# Patient Record
Sex: FEMALE | Race: WHITE | NOT HISPANIC OR LATINO | URBAN - METROPOLITAN AREA
[De-identification: names, ages, dates, MRNs, and addresses within clinical notes are randomized per-mention and may not be internally consistent; named-entity substitution may affect disease eponyms.]

---

## 2019-08-06 ENCOUNTER — INPATIENT (INPATIENT)
Facility: HOSPITAL | Age: 64
LOS: 0 days | Discharge: HOME | DRG: 392 | End: 2019-08-07
Attending: STUDENT IN AN ORGANIZED HEALTH CARE EDUCATION/TRAINING PROGRAM | Admitting: STUDENT IN AN ORGANIZED HEALTH CARE EDUCATION/TRAINING PROGRAM
Payer: COMMERCIAL

## 2019-08-06 VITALS
DIASTOLIC BLOOD PRESSURE: 79 MMHG | RESPIRATION RATE: 16 BRPM | OXYGEN SATURATION: 97 % | SYSTOLIC BLOOD PRESSURE: 137 MMHG | HEIGHT: 61 IN | WEIGHT: 216.05 LBS | HEART RATE: 76 BPM | TEMPERATURE: 99 F

## 2019-08-06 DIAGNOSIS — F31.9 BIPOLAR DISORDER, UNSPECIFIED: ICD-10-CM

## 2019-08-06 DIAGNOSIS — I10 ESSENTIAL (PRIMARY) HYPERTENSION: ICD-10-CM

## 2019-08-06 DIAGNOSIS — Z91.89 OTHER SPECIFIED PERSONAL RISK FACTORS, NOT ELSEWHERE CLASSIFIED: ICD-10-CM

## 2019-08-06 DIAGNOSIS — R63.8 OTHER SYMPTOMS AND SIGNS CONCERNING FOOD AND FLUID INTAKE: ICD-10-CM

## 2019-08-06 DIAGNOSIS — E78.5 HYPERLIPIDEMIA, UNSPECIFIED: ICD-10-CM

## 2019-08-06 DIAGNOSIS — K52.9 NONINFECTIVE GASTROENTERITIS AND COLITIS, UNSPECIFIED: ICD-10-CM

## 2019-08-06 LAB
ALBUMIN SERPL ELPH-MCNC: 4 G/DL — SIGNIFICANT CHANGE UP (ref 3.3–5)
ALP SERPL-CCNC: 111 U/L — SIGNIFICANT CHANGE UP (ref 40–120)
ALT FLD-CCNC: 13 U/L — SIGNIFICANT CHANGE UP (ref 10–45)
ANION GAP SERPL CALC-SCNC: 11 MMOL/L — SIGNIFICANT CHANGE UP (ref 5–17)
APTT BLD: 32.2 SEC — SIGNIFICANT CHANGE UP (ref 27.5–36.3)
AST SERPL-CCNC: 18 U/L — SIGNIFICANT CHANGE UP (ref 10–40)
BASOPHILS # BLD AUTO: 0.01 K/UL — SIGNIFICANT CHANGE UP (ref 0–0.2)
BASOPHILS NFR BLD AUTO: 0.2 % — SIGNIFICANT CHANGE UP (ref 0–2)
BILIRUB SERPL-MCNC: 0.6 MG/DL — SIGNIFICANT CHANGE UP (ref 0.2–1.2)
BLD GP AB SCN SERPL QL: NEGATIVE — SIGNIFICANT CHANGE UP
BUN SERPL-MCNC: 12 MG/DL — SIGNIFICANT CHANGE UP (ref 7–23)
CALCIUM SERPL-MCNC: 9.3 MG/DL — SIGNIFICANT CHANGE UP (ref 8.4–10.5)
CHLORIDE SERPL-SCNC: 109 MMOL/L — HIGH (ref 96–108)
CO2 SERPL-SCNC: 25 MMOL/L — SIGNIFICANT CHANGE UP (ref 22–31)
CREAT SERPL-MCNC: 0.84 MG/DL — SIGNIFICANT CHANGE UP (ref 0.5–1.3)
EOSINOPHIL # BLD AUTO: 0.02 K/UL — SIGNIFICANT CHANGE UP (ref 0–0.5)
EOSINOPHIL NFR BLD AUTO: 0.4 % — SIGNIFICANT CHANGE UP (ref 0–6)
GLUCOSE SERPL-MCNC: 115 MG/DL — HIGH (ref 70–99)
HCT VFR BLD CALC: 41.1 % — SIGNIFICANT CHANGE UP (ref 34.5–45)
HGB BLD-MCNC: 13.6 G/DL — SIGNIFICANT CHANGE UP (ref 11.5–15.5)
IMM GRANULOCYTES NFR BLD AUTO: 0.2 % — SIGNIFICANT CHANGE UP (ref 0–1.5)
INR BLD: 1.17 — HIGH (ref 0.88–1.16)
LYMPHOCYTES # BLD AUTO: 1.01 K/UL — SIGNIFICANT CHANGE UP (ref 1–3.3)
LYMPHOCYTES # BLD AUTO: 18.4 % — SIGNIFICANT CHANGE UP (ref 13–44)
MCHC RBC-ENTMCNC: 29.5 PG — SIGNIFICANT CHANGE UP (ref 27–34)
MCHC RBC-ENTMCNC: 33.1 GM/DL — SIGNIFICANT CHANGE UP (ref 32–36)
MCV RBC AUTO: 89.2 FL — SIGNIFICANT CHANGE UP (ref 80–100)
MONOCYTES # BLD AUTO: 0.36 K/UL — SIGNIFICANT CHANGE UP (ref 0–0.9)
MONOCYTES NFR BLD AUTO: 6.6 % — SIGNIFICANT CHANGE UP (ref 2–14)
NEUTROPHILS # BLD AUTO: 4.08 K/UL — SIGNIFICANT CHANGE UP (ref 1.8–7.4)
NEUTROPHILS NFR BLD AUTO: 74.2 % — SIGNIFICANT CHANGE UP (ref 43–77)
NRBC # BLD: 0 /100 WBCS — SIGNIFICANT CHANGE UP (ref 0–0)
OB PNL STL: NEGATIVE — SIGNIFICANT CHANGE UP
PLATELET # BLD AUTO: 212 K/UL — SIGNIFICANT CHANGE UP (ref 150–400)
POTASSIUM SERPL-MCNC: 3.3 MMOL/L — LOW (ref 3.5–5.3)
POTASSIUM SERPL-SCNC: 3.3 MMOL/L — LOW (ref 3.5–5.3)
PROT SERPL-MCNC: 6.9 G/DL — SIGNIFICANT CHANGE UP (ref 6–8.3)
PROTHROM AB SERPL-ACNC: 13.3 SEC — HIGH (ref 10–12.9)
RBC # BLD: 4.61 M/UL — SIGNIFICANT CHANGE UP (ref 3.8–5.2)
RBC # FLD: 13.2 % — SIGNIFICANT CHANGE UP (ref 10.3–14.5)
RH IG SCN BLD-IMP: POSITIVE — SIGNIFICANT CHANGE UP
SODIUM SERPL-SCNC: 145 MMOL/L — SIGNIFICANT CHANGE UP (ref 135–145)
WBC # BLD: 5.49 K/UL — SIGNIFICANT CHANGE UP (ref 3.8–10.5)
WBC # FLD AUTO: 5.49 K/UL — SIGNIFICANT CHANGE UP (ref 3.8–10.5)

## 2019-08-06 PROCEDURE — 99285 EMERGENCY DEPT VISIT HI MDM: CPT

## 2019-08-06 PROCEDURE — 74177 CT ABD & PELVIS W/CONTRAST: CPT | Mod: 26

## 2019-08-06 RX ORDER — POTASSIUM CHLORIDE 20 MEQ
40 PACKET (EA) ORAL ONCE
Refills: 0 | Status: COMPLETED | OUTPATIENT
Start: 2019-08-06 | End: 2019-08-06

## 2019-08-06 RX ORDER — METRONIDAZOLE 500 MG
500 TABLET ORAL ONCE
Refills: 0 | Status: COMPLETED | OUTPATIENT
Start: 2019-08-06 | End: 2019-08-06

## 2019-08-06 RX ORDER — ONDANSETRON 8 MG/1
4 TABLET, FILM COATED ORAL ONCE
Refills: 0 | Status: COMPLETED | OUTPATIENT
Start: 2019-08-06 | End: 2019-08-06

## 2019-08-06 RX ORDER — SODIUM CHLORIDE 9 MG/ML
1000 INJECTION INTRAMUSCULAR; INTRAVENOUS; SUBCUTANEOUS ONCE
Refills: 0 | Status: COMPLETED | OUTPATIENT
Start: 2019-08-06 | End: 2019-08-06

## 2019-08-06 RX ORDER — CIPROFLOXACIN LACTATE 400MG/40ML
400 VIAL (ML) INTRAVENOUS ONCE
Refills: 0 | Status: COMPLETED | OUTPATIENT
Start: 2019-08-06 | End: 2019-08-06

## 2019-08-06 RX ORDER — PANTOPRAZOLE SODIUM 20 MG/1
80 TABLET, DELAYED RELEASE ORAL ONCE
Refills: 0 | Status: COMPLETED | OUTPATIENT
Start: 2019-08-06 | End: 2019-08-06

## 2019-08-06 RX ORDER — ACETAMINOPHEN 500 MG
650 TABLET ORAL ONCE
Refills: 0 | Status: COMPLETED | OUTPATIENT
Start: 2019-08-06 | End: 2019-08-06

## 2019-08-06 RX ORDER — IOHEXOL 300 MG/ML
30 INJECTION, SOLUTION INTRAVENOUS ONCE
Refills: 0 | Status: COMPLETED | OUTPATIENT
Start: 2019-08-06 | End: 2019-08-06

## 2019-08-06 RX ORDER — OLANZAPINE 15 MG/1
15 TABLET, FILM COATED ORAL EVERY 24 HOURS
Refills: 0 | Status: DISCONTINUED | OUTPATIENT
Start: 2019-08-07 | End: 2019-08-07

## 2019-08-06 RX ADMIN — Medication 200 MILLIGRAM(S): at 21:51

## 2019-08-06 RX ADMIN — PANTOPRAZOLE SODIUM 80 MILLIGRAM(S): 20 TABLET, DELAYED RELEASE ORAL at 17:19

## 2019-08-06 RX ADMIN — ONDANSETRON 4 MILLIGRAM(S): 8 TABLET, FILM COATED ORAL at 17:20

## 2019-08-06 RX ADMIN — ONDANSETRON 4 MILLIGRAM(S): 8 TABLET, FILM COATED ORAL at 21:51

## 2019-08-06 RX ADMIN — Medication 650 MILLIGRAM(S): at 17:20

## 2019-08-06 RX ADMIN — IOHEXOL 30 MILLILITER(S): 300 INJECTION, SOLUTION INTRAVENOUS at 17:20

## 2019-08-06 RX ADMIN — Medication 650 MILLIGRAM(S): at 18:20

## 2019-08-06 RX ADMIN — Medication 100 MILLIGRAM(S): at 22:51

## 2019-08-06 RX ADMIN — SODIUM CHLORIDE 1000 MILLILITER(S): 9 INJECTION INTRAMUSCULAR; INTRAVENOUS; SUBCUTANEOUS at 17:20

## 2019-08-06 RX ADMIN — SODIUM CHLORIDE 1000 MILLILITER(S): 9 INJECTION INTRAMUSCULAR; INTRAVENOUS; SUBCUTANEOUS at 21:51

## 2019-08-06 NOTE — H&P ADULT - PROBLEM SELECTOR PLAN 1
ct abd showing colitis, no wbc or fevers, ddx: infectious vs ibd vs ibs. s/p cipro flagyl in ed  -monitor wbc/ fevers  -gi pcr  -ivf as needed  -stool cx  -gi fu outpt for r/o IBD or IBS if infectious w/u neg  -ceftriaxone and flagyl (pen allergic but last rxn as a child and only hives)    #reported black stool  reports black tarry stool x 2 weeks, no red blood, vss, hb 13, low concern for ugib given nl hb and bun (pt would have hb drop if 2 weeks of gib), fobt neg  -monitor hb  -monitor bms to assess if look like melena ct abd showing colitis, no wbc or fevers, ddx: infectious vs ibd vs ibs. s/p cipro flagyl in ed  -monitor wbc/ fevers  -gi pcr  -ivf as needed  -stool cx  -gi fu outpt for r/o IBD or IBS if infectious w/u neg  -ceftriaxone and flagyl (pen allergic but last rxn as a child and only hives)  -zofran prn for nausea  -ekg for qtc monitoring    #reported black stool  reports black tarry stool x 2 weeks, no red blood, vss, hb 13, low concern for ugib given nl hb and bun (pt would have hb drop if 2 weeks of gib), fobt neg  -monitor hb  -monitor bms to assess if look like melena

## 2019-08-06 NOTE — H&P ADULT - HISTORY OF PRESENT ILLNESS
63F PMH bipolar, HTN, HLD p/w n/v/d x 2 weeks. Pt reports 2 weeks of n/v/d as well as weakness x 2 weeks, and 30 lb weight loss in past month due to poor appetite Pt reports has not ate in 2 weeks, only been able to tolerate liquids due to poor appetite and nausea. Her diarrhea is loose BMs (non watery), 3-4 per day (baseline 1/day), nonbloody. Does endorse that stool is "black and tarry." Vomiting is nonbloody. Denies abd pain, fevers, chills. No recent travel (except to [place] 3 weeks ago), no unusual food, no sick contacts. No GI hx, had nl colonoscopy and EGD (unknown why, pt reports for screening) 10 years ago. Denies dizziness, or SOB.     In ED VSS, no anemia, CT abd showing colitis. S/p cipro and flagyl in ED and 2 L NS, s/p zofran for nausea.

## 2019-08-06 NOTE — H&P ADULT - NSHPSOCIALHISTORY_GEN_ALL_CORE
Lives with , independent in ADLS/ IADLS. No drug use. Quit smoking 20 years ago (smoked a few cigs/ week for 5 years). Drinks 1 drink per day until 2 weeks ago (when sx started).

## 2019-08-06 NOTE — H&P ADULT - NSHPPHYSICALEXAM_GEN_ALL_CORE
.  VITAL SIGNS:  T(C): 36.6 (08-06-19 @ 22:57), Max: 37.1 (08-06-19 @ 16:06)  T(F): 97.8 (08-06-19 @ 22:57), Max: 98.8 (08-06-19 @ 16:06)  HR: 96 (08-06-19 @ 22:57) (76 - 96)  BP: 148/84 (08-06-19 @ 22:57) (125/75 - 148/84)  BP(mean): --  RR: 18 (08-06-19 @ 22:57) (16 - 18)  SpO2: 95% (08-06-19 @ 22:57) (95% - 97%)  Wt(kg): --    PHYSICAL EXAM:    Constitutional: WDWN resting comfortably in bed; NAD  Head: NC/AT  Eyes: PERRL, EOMI, clear conjunctiva  ENT: no nasal discharge; uvula midline, no oropharyngeal erythema or exudates; MMM  Neck: supple; no JVD or thyromegaly  Respiratory: CTA B/L; no W/R/R, no retractions  Cardiac: +S1/S2; RRR; no M/R/G;  Gastrointestinal: soft, NT/ND; no rebound or guarding; +BSx4  Extremities: WWP, no clubbing or cyanosis; no peripheral edema  Musculoskeletal: NROM x4; no joint swelling, tenderness or erythema  Vascular: 2+ radial, femoral, DP/PT pulses B/L  Dermatologic: skin warm, dry and intact; no rashes, wounds, or scars  Neurologic: AAOx3; CNII-XII grossly intact; no focal deficits  Psychiatric: affect and characteristics of appearance, verbalizations, behaviors are appropriate .  VITAL SIGNS:  T(C): 36.6 (08-06-19 @ 22:57), Max: 37.1 (08-06-19 @ 16:06)  T(F): 97.8 (08-06-19 @ 22:57), Max: 98.8 (08-06-19 @ 16:06)  HR: 96 (08-06-19 @ 22:57) (76 - 96)  BP: 148/84 (08-06-19 @ 22:57) (125/75 - 148/84)  BP(mean): --  RR: 18 (08-06-19 @ 22:57) (16 - 18)  SpO2: 95% (08-06-19 @ 22:57) (95% - 97%)  Wt(kg): --    PHYSICAL EXAM:  Constitutional: WDWN resting comfortably in bed; NAD  Head: NC/AT  Eyes: PERRL, EOMI, clear conjunctiva  ENT: no nasal discharge; uvula midline, no oropharyngeal erythema or exudates; MMM  Neck: supple; no JVD or thyromegaly  Respiratory: CTA B/L; no W/R/R, no retractions  Cardiac: +S1/S2; RRR; no M/R/G;  Gastrointestinal: soft, tender in LLQ, ND; no rebound or guarding; +BSx4  Extremities: WWP, no clubbing or cyanosis; no peripheral edema  Musculoskeletal: NROM x4; no joint swelling, tenderness or erythema  Vascular: 2+ radial, femoral, DP/PT pulses B/L  Dermatologic: skin warm, dry and intact; no rashes, wounds, or scars  Neurologic: AAOx3; CNII-XII grossly intact; no focal deficits  Psychiatric: affect and characteristics of appearance, verbalizations, behaviors are appropriate

## 2019-08-06 NOTE — ED ADULT TRIAGE NOTE - CHIEF COMPLAINT QUOTE
Patient states was sent by Dr. White for CT scan to r/o Diverticulitis, c/o of nausea, vomitting, black stools, decreased appetite and black stools and LLQ abdominal pain, symptoms X 2 weeks.

## 2019-08-06 NOTE — ED ADULT NURSE NOTE - OBJECTIVE STATEMENT
Pt is a 63y female presented to ED w/ c/o lower quadrant abdominal pain on palpation, dark tarry stools, N/V x 2 weeks and unintentional weight loss. Denies fever/chills/ chest pain/ SOB.

## 2019-08-06 NOTE — ED ADULT NURSE REASSESSMENT NOTE - NS ED NURSE REASSESS COMMENT FT1
Admitting team at bedside, plan of care explained. Pt in no acute distress. Will continue to monitor.

## 2019-08-06 NOTE — H&P ADULT - PROBLEM SELECTOR PLAN 5
F: PO hydration, ivf as needed  E: replete PRN  N: dash diet, if tolerated  DVT ppx: lovenox  other ppx: none required  FULL CODE  dispo: RMF  Ambulate as tolerated

## 2019-08-06 NOTE — ED PROVIDER NOTE - OBJECTIVE STATEMENT
63F PMH bipolar, HTN p/w NV. Pt states that she has 2w of multiple NBNB NV. Also w/ 3-4 episodes of black tarry stool x2w. Also ~1mo of ~30lb unintentional weight loss and occasional night sweats. Denies any abd pain but feels "sensitive" to L abd. Went to pmd today who referred to ED. Denies HA, SOB/CP, lightheaded, rashes, recent travel, sick contacts, urinary complaints, LE pain/swelling, bloody stool. No prior EGD/colonoscopy. Has cough for many yrs, no acute changes.   PMD Dr. Peggy White  Cannot recall all meds, not on any AC

## 2019-08-06 NOTE — ED PROVIDER NOTE - PROGRESS NOTE DETAILS
Klepfish: labs grossly wnl. CT c/w ileocolitis. Pt has no pain but does have persistent nausea and unable to tolerate po in ED. Pt very uncomfortable going home. Given persistent symptoms and unable to tolerate po, will admit for further care. PMD not at [facility].

## 2019-08-06 NOTE — H&P ADULT - ATTENDING COMMENTS
Pt. seen and examined by me earlier today; I have read Dr. Pompa's H&P, I agree w/ her findings and plan of care as documented, w/ update; Pt. w/ acute colitis, likely infectious; Pt. clinically much-improved s/p abx, tolerating PO; hypokalemia repleted; Pt. medically-clear for d/c home on 5-day course of PO abx, bland diet as tolerated; Pt. advised to f/u w/ GI as outpatient

## 2019-08-06 NOTE — ED PROVIDER NOTE - PHYSICAL EXAMINATION
abd: BS+, soft, minimal LUQ and LLQ ttp, no rebound/guarding.  no LE edema, normal equal distal pulses. no CVAT. abd: BS+, soft, minimal LUQ and LLQ ttp, no rebound/guarding.  no LE edema, normal equal distal pulses. no CVAT.  TRE woods chaperone: minimal specks of black on digital exam, no brbpr.

## 2019-08-06 NOTE — H&P ADULT - NSHPLABSRESULTS_GEN_ALL_CORE
.  LABS:                         13.6   5.49  )-----------( 212      ( 06 Aug 2019 17:17 )             41.1     08-06    145  |  109<H>  |  12  ----------------------------<  115<H>  3.3<L>   |  25  |  0.84    Ca    9.3      06 Aug 2019 17:17    TPro  6.9  /  Alb  4.0  /  TBili  0.6  /  DBili  x   /  AST  18  /  ALT  13  /  AlkPhos  111  08-06    PT/INR - ( 06 Aug 2019 17:17 )   PT: 13.3 sec;   INR: 1.17          PTT - ( 06 Aug 2019 17:17 )  PTT:32.2 sec              RADIOLOGY, EKG & ADDITIONAL TESTS: Reviewed.

## 2019-08-07 ENCOUNTER — TRANSCRIPTION ENCOUNTER (OUTPATIENT)
Age: 64
End: 2019-08-07

## 2019-08-07 VITALS
SYSTOLIC BLOOD PRESSURE: 125 MMHG | DIASTOLIC BLOOD PRESSURE: 84 MMHG | OXYGEN SATURATION: 97 % | TEMPERATURE: 98 F | HEART RATE: 86 BPM | RESPIRATION RATE: 17 BRPM

## 2019-08-07 LAB
ALBUMIN SERPL ELPH-MCNC: 3.3 G/DL — SIGNIFICANT CHANGE UP (ref 3.3–5)
ALP SERPL-CCNC: 91 U/L — SIGNIFICANT CHANGE UP (ref 40–120)
ALT FLD-CCNC: 11 U/L — SIGNIFICANT CHANGE UP (ref 10–45)
ANION GAP SERPL CALC-SCNC: 9 MMOL/L — SIGNIFICANT CHANGE UP (ref 5–17)
AST SERPL-CCNC: 13 U/L — SIGNIFICANT CHANGE UP (ref 10–40)
BILIRUB SERPL-MCNC: 0.4 MG/DL — SIGNIFICANT CHANGE UP (ref 0.2–1.2)
BUN SERPL-MCNC: 9 MG/DL — SIGNIFICANT CHANGE UP (ref 7–23)
CALCIUM SERPL-MCNC: 8.4 MG/DL — SIGNIFICANT CHANGE UP (ref 8.4–10.5)
CHLORIDE SERPL-SCNC: 112 MMOL/L — HIGH (ref 96–108)
CO2 SERPL-SCNC: 23 MMOL/L — SIGNIFICANT CHANGE UP (ref 22–31)
CREAT SERPL-MCNC: 0.72 MG/DL — SIGNIFICANT CHANGE UP (ref 0.5–1.3)
GLUCOSE SERPL-MCNC: 99 MG/DL — SIGNIFICANT CHANGE UP (ref 70–99)
HCT VFR BLD CALC: 37 % — SIGNIFICANT CHANGE UP (ref 34.5–45)
HCV AB S/CO SERPL IA: 0.06 S/CO — SIGNIFICANT CHANGE UP
HCV AB SERPL-IMP: SIGNIFICANT CHANGE UP
HGB BLD-MCNC: 11.8 G/DL — SIGNIFICANT CHANGE UP (ref 11.5–15.5)
HIV 1+2 AB+HIV1 P24 AG SERPL QL IA: SIGNIFICANT CHANGE UP
MAGNESIUM SERPL-MCNC: 2.1 MG/DL — SIGNIFICANT CHANGE UP (ref 1.6–2.6)
MCHC RBC-ENTMCNC: 28.9 PG — SIGNIFICANT CHANGE UP (ref 27–34)
MCHC RBC-ENTMCNC: 31.9 GM/DL — LOW (ref 32–36)
MCV RBC AUTO: 90.7 FL — SIGNIFICANT CHANGE UP (ref 80–100)
NRBC # BLD: 0 /100 WBCS — SIGNIFICANT CHANGE UP (ref 0–0)
PHOSPHATE SERPL-MCNC: 3.5 MG/DL — SIGNIFICANT CHANGE UP (ref 2.5–4.5)
PLATELET # BLD AUTO: 193 K/UL — SIGNIFICANT CHANGE UP (ref 150–400)
POTASSIUM SERPL-MCNC: 3.2 MMOL/L — LOW (ref 3.5–5.3)
POTASSIUM SERPL-SCNC: 3.2 MMOL/L — LOW (ref 3.5–5.3)
PROT SERPL-MCNC: 5.9 G/DL — LOW (ref 6–8.3)
RBC # BLD: 4.08 M/UL — SIGNIFICANT CHANGE UP (ref 3.8–5.2)
RBC # FLD: 13.2 % — SIGNIFICANT CHANGE UP (ref 10.3–14.5)
SODIUM SERPL-SCNC: 144 MMOL/L — SIGNIFICANT CHANGE UP (ref 135–145)
WBC # BLD: 5.48 K/UL — SIGNIFICANT CHANGE UP (ref 3.8–10.5)
WBC # FLD AUTO: 5.48 K/UL — SIGNIFICANT CHANGE UP (ref 3.8–10.5)

## 2019-08-07 PROCEDURE — 85610 PROTHROMBIN TIME: CPT

## 2019-08-07 PROCEDURE — 96374 THER/PROPH/DIAG INJ IV PUSH: CPT | Mod: XU

## 2019-08-07 PROCEDURE — 87389 HIV-1 AG W/HIV-1&-2 AB AG IA: CPT

## 2019-08-07 PROCEDURE — 36415 COLL VENOUS BLD VENIPUNCTURE: CPT

## 2019-08-07 PROCEDURE — 99285 EMERGENCY DEPT VISIT HI MDM: CPT | Mod: 25

## 2019-08-07 PROCEDURE — 86803 HEPATITIS C AB TEST: CPT

## 2019-08-07 PROCEDURE — 85730 THROMBOPLASTIN TIME PARTIAL: CPT

## 2019-08-07 PROCEDURE — 80053 COMPREHEN METABOLIC PANEL: CPT

## 2019-08-07 PROCEDURE — 83735 ASSAY OF MAGNESIUM: CPT

## 2019-08-07 PROCEDURE — 96375 TX/PRO/DX INJ NEW DRUG ADDON: CPT

## 2019-08-07 PROCEDURE — 84100 ASSAY OF PHOSPHORUS: CPT

## 2019-08-07 PROCEDURE — 86850 RBC ANTIBODY SCREEN: CPT

## 2019-08-07 PROCEDURE — 85025 COMPLETE CBC W/AUTO DIFF WBC: CPT

## 2019-08-07 PROCEDURE — 99223 1ST HOSP IP/OBS HIGH 75: CPT | Mod: GC,AI

## 2019-08-07 PROCEDURE — 86900 BLOOD TYPING SEROLOGIC ABO: CPT

## 2019-08-07 PROCEDURE — 74177 CT ABD & PELVIS W/CONTRAST: CPT

## 2019-08-07 PROCEDURE — 86901 BLOOD TYPING SEROLOGIC RH(D): CPT

## 2019-08-07 PROCEDURE — 85027 COMPLETE CBC AUTOMATED: CPT

## 2019-08-07 RX ORDER — CEFPODOXIME PROXETIL 100 MG
2 TABLET ORAL
Qty: 12 | Refills: 0
Start: 2019-08-07 | End: 2019-08-12

## 2019-08-07 RX ORDER — CEFTRIAXONE 500 MG/1
1000 INJECTION, POWDER, FOR SOLUTION INTRAMUSCULAR; INTRAVENOUS EVERY 24 HOURS
Refills: 0 | Status: DISCONTINUED | OUTPATIENT
Start: 2019-08-07 | End: 2019-08-07

## 2019-08-07 RX ORDER — METRONIDAZOLE 500 MG
500 TABLET ORAL EVERY 8 HOURS
Refills: 0 | Status: DISCONTINUED | OUTPATIENT
Start: 2019-08-07 | End: 2019-08-07

## 2019-08-07 RX ORDER — METRONIDAZOLE 500 MG
1 TABLET ORAL
Qty: 21 | Refills: 0
Start: 2019-08-07 | End: 2019-08-13

## 2019-08-07 RX ORDER — ENOXAPARIN SODIUM 100 MG/ML
60 INJECTION SUBCUTANEOUS EVERY 24 HOURS
Refills: 0 | Status: DISCONTINUED | OUTPATIENT
Start: 2019-08-07 | End: 2019-08-07

## 2019-08-07 RX ORDER — ONDANSETRON 8 MG/1
4 TABLET, FILM COATED ORAL EVERY 6 HOURS
Refills: 0 | Status: DISCONTINUED | OUTPATIENT
Start: 2019-08-07 | End: 2019-08-07

## 2019-08-07 RX ORDER — METRONIDAZOLE 500 MG
1 TABLET ORAL
Qty: 18 | Refills: 0
Start: 2019-08-07 | End: 2019-08-12

## 2019-08-07 RX ORDER — ATORVASTATIN CALCIUM 80 MG/1
80 TABLET, FILM COATED ORAL AT BEDTIME
Refills: 0 | Status: DISCONTINUED | OUTPATIENT
Start: 2019-08-07 | End: 2019-08-07

## 2019-08-07 RX ORDER — POTASSIUM CHLORIDE 20 MEQ
40 PACKET (EA) ORAL EVERY 4 HOURS
Refills: 0 | Status: COMPLETED | OUTPATIENT
Start: 2019-08-07 | End: 2019-08-07

## 2019-08-07 RX ADMIN — Medication 100 MILLIGRAM(S): at 06:21

## 2019-08-07 RX ADMIN — Medication 40 MILLIEQUIVALENT(S): at 00:00

## 2019-08-07 RX ADMIN — ENOXAPARIN SODIUM 60 MILLIGRAM(S): 100 INJECTION SUBCUTANEOUS at 06:21

## 2019-08-07 RX ADMIN — CEFTRIAXONE 100 MILLIGRAM(S): 500 INJECTION, POWDER, FOR SOLUTION INTRAMUSCULAR; INTRAVENOUS at 10:14

## 2019-08-07 RX ADMIN — Medication 40 MILLIEQUIVALENT(S): at 10:13

## 2019-08-07 RX ADMIN — Medication 100 MILLIGRAM(S): at 13:48

## 2019-08-07 RX ADMIN — Medication 40 MILLIEQUIVALENT(S): at 13:48

## 2019-08-07 RX ADMIN — OLANZAPINE 15 MILLIGRAM(S): 15 TABLET, FILM COATED ORAL at 00:16

## 2019-08-07 NOTE — DISCHARGE NOTE NURSING/CASE MANAGEMENT/SOCIAL WORK - NSDCFUADDAPPT_GEN_ALL_CORE_FT
Christopher Woods MD  Internal Medicine  [address], [place] [address]  Phone: [phone]  Appointment: 8/13 2:30 PM

## 2019-08-07 NOTE — DISCHARGE NOTE PROVIDER - CARE PROVIDER_API CALL
Peggy White  [address], [place] [address]  Phone: [phone]  Fax: (   )    -  Follow Up Time:

## 2019-08-07 NOTE — DISCHARGE NOTE NURSING/CASE MANAGEMENT/SOCIAL WORK - NSDCDPATPORTLINK_GEN_ALL_CORE
You can access the [facility] Patient Portal, offered by [facility], by registering with the following website: [email or web address]

## 2019-08-07 NOTE — DISCHARGE NOTE PROVIDER - HOSPITAL COURSE
63F PMH bipolar, HTN, HLD presenting w/ nausea, vomiting, and loose non-bloody dark diarrhea x 2 weeks w/ a 30 lb weight loss in the past month due to poor appetite. In ED VSS, no white count, no anemia, CT abd showed mild ilieocolitis and pt received started on cipro and flagyl and given Zofran for nausea. The source of colitis is unclear as may be infectious vs inflammatory. Pt does report family history w/ mother having multiple bouts of diarrhea throughout her life. Pt d/c in stable condition, eating w/ appetite, and having BM and passing gas and to follow up with her pcp Dr. White. 63F PMH bipolar, HTN, HLD presenting w/ nausea, vomiting, and loose non-bloody dark diarrhea x 2 weeks w/ a 30 lb weight loss in the past month due to poor appetite. In ED VSS, no white count, no anemia, CT abd showed mild ilieocolitis and pt received started on cipro and flagyl and given Zofran for nausea. The source of colitis is unclear as may be infectious vs inflammatory. Pt does report family history w/ mother having multiple bouts of diarrhea throughout her life. Pt d/c in stable condition, eating w/ appetite, and having BM and passing gas and to follow up with her pcp Dr. White. D/c w/ antibiotics and complete a 7 day course.

## 2019-08-07 NOTE — DISCHARGE NOTE PROVIDER - PROVIDER TOKENS
FREE:[LAST:[Chritsopher],FIRST:[Peggy],PHONE:[phone]],FAX:[(   )    -],ADDRESS:[[facility] [place] [address] [place] [address]]]

## 2019-08-07 NOTE — DISCHARGE NOTE PROVIDER - NSDCFUADDAPPT_GEN_ALL_CORE_FT
Christopher Woods MD  Internal Medicine  [address], [place] [address]  Phone: [phone]  Appointment: 8/13 2:30 PM

## 2019-08-07 NOTE — DISCHARGE NOTE PROVIDER - NSDCCPCAREPLAN_GEN_ALL_CORE_FT
PRINCIPAL DISCHARGE DIAGNOSIS  Diagnosis: Ileocolitis  Assessment and Plan of Treatment: You came in with nausea, vomiting, and diarrhea which you had for the last 2 weeks. You were found to have "iliocolitis", which means there was inflammation in your colon. This may be due to a number of different causes such as infection or inflammatory bowel disease. Make sure to continue with your prescribed antibiotics of flagyl and ceftriazone as prescribed and to follow up with Dr. White about this. PRINCIPAL DISCHARGE DIAGNOSIS  Diagnosis: Ileocolitis  Assessment and Plan of Treatment: You came in with nausea, vomiting, and diarrhea which you had for the last 2 weeks. You were found to have "iliocolitis", which means there was inflammation in your colon. This may be due to a number of different causes such as infection or inflammatory bowel disease. Make sure to continue with your prescribed antibiotics of flagyl and cefpodomime for 6 more days as prescribed and to follow up with Dr. White about this.

## 2019-08-19 DIAGNOSIS — A09 INFECTIOUS GASTROENTERITIS AND COLITIS, UNSPECIFIED: ICD-10-CM

## 2019-08-19 DIAGNOSIS — Z87.891 PERSONAL HISTORY OF NICOTINE DEPENDENCE: ICD-10-CM

## 2019-08-19 DIAGNOSIS — Z88.8 ALLERGY STATUS TO OTHER DRUGS, MEDICAMENTS AND BIOLOGICAL SUBSTANCES STATUS: ICD-10-CM

## 2019-08-19 DIAGNOSIS — F31.9 BIPOLAR DISORDER, UNSPECIFIED: ICD-10-CM

## 2019-08-19 DIAGNOSIS — E78.5 HYPERLIPIDEMIA, UNSPECIFIED: ICD-10-CM

## 2019-08-19 DIAGNOSIS — I10 ESSENTIAL (PRIMARY) HYPERTENSION: ICD-10-CM

## 2020-06-20 PROCEDURE — 99285 EMERGENCY DEPT VISIT HI MDM: CPT | Mod: 25

## 2020-06-20 PROCEDURE — 93010 ELECTROCARDIOGRAM REPORT: CPT

## 2020-06-30 ENCOUNTER — INPATIENT (INPATIENT)
Facility: HOSPITAL | Age: 65
LOS: 6 days | Discharge: HOME | End: 2020-07-07
Attending: PSYCHIATRY & NEUROLOGY | Admitting: PSYCHIATRY & NEUROLOGY
Payer: MEDICARE

## 2020-06-30 VITALS
TEMPERATURE: 98 F | HEART RATE: 71 BPM | SYSTOLIC BLOOD PRESSURE: 161 MMHG | OXYGEN SATURATION: 96 % | DIASTOLIC BLOOD PRESSURE: 101 MMHG | RESPIRATION RATE: 16 BRPM

## 2020-06-30 DIAGNOSIS — F31.60 BIPOLAR DISORDER, CURRENT EPISODE MIXED, UNSPECIFIED: ICD-10-CM

## 2020-06-30 LAB
ALBUMIN SERPL ELPH-MCNC: 4.1 G/DL — SIGNIFICANT CHANGE UP (ref 3.3–5)
ALP SERPL-CCNC: 86 U/L — SIGNIFICANT CHANGE UP (ref 40–120)
ALT FLD-CCNC: 11 U/L — SIGNIFICANT CHANGE UP (ref 4–33)
ANION GAP SERPL CALC-SCNC: 15 MMO/L — HIGH (ref 7–14)
APAP SERPL-MCNC: < 15 UG/ML — LOW (ref 15–25)
APPEARANCE UR: CLEAR — SIGNIFICANT CHANGE UP
AST SERPL-CCNC: 17 U/L — SIGNIFICANT CHANGE UP (ref 4–32)
BACTERIA # UR AUTO: NEGATIVE — SIGNIFICANT CHANGE UP
BASOPHILS # BLD AUTO: 0.04 K/UL — SIGNIFICANT CHANGE UP (ref 0–0.2)
BASOPHILS NFR BLD AUTO: 0.5 % — SIGNIFICANT CHANGE UP (ref 0–2)
BILIRUB SERPL-MCNC: 0.3 MG/DL — SIGNIFICANT CHANGE UP (ref 0.2–1.2)
BILIRUB UR-MCNC: NEGATIVE — SIGNIFICANT CHANGE UP
BLOOD UR QL VISUAL: NEGATIVE — SIGNIFICANT CHANGE UP
BUN SERPL-MCNC: 30 MG/DL — HIGH (ref 7–23)
CALCIUM SERPL-MCNC: 9.4 MG/DL — SIGNIFICANT CHANGE UP (ref 8.4–10.5)
CHLORIDE SERPL-SCNC: 105 MMOL/L — SIGNIFICANT CHANGE UP (ref 98–107)
CO2 SERPL-SCNC: 21 MMOL/L — LOW (ref 22–31)
COLOR SPEC: YELLOW — SIGNIFICANT CHANGE UP
CREAT SERPL-MCNC: 1.06 MG/DL — SIGNIFICANT CHANGE UP (ref 0.5–1.3)
EOSINOPHIL # BLD AUTO: 0.09 K/UL — SIGNIFICANT CHANGE UP (ref 0–0.5)
EOSINOPHIL NFR BLD AUTO: 1.1 % — SIGNIFICANT CHANGE UP (ref 0–6)
ETHANOL BLD-MCNC: < 10 MG/DL — SIGNIFICANT CHANGE UP
GLUCOSE SERPL-MCNC: 83 MG/DL — SIGNIFICANT CHANGE UP (ref 70–99)
GLUCOSE UR-MCNC: NEGATIVE — SIGNIFICANT CHANGE UP
HCT VFR BLD CALC: 44.1 % — SIGNIFICANT CHANGE UP (ref 34.5–45)
HGB BLD-MCNC: 14.5 G/DL — SIGNIFICANT CHANGE UP (ref 11.5–15.5)
HYALINE CASTS # UR AUTO: SIGNIFICANT CHANGE UP
IMM GRANULOCYTES NFR BLD AUTO: 0.2 % — SIGNIFICANT CHANGE UP (ref 0–1.5)
KETONES UR-MCNC: NEGATIVE — SIGNIFICANT CHANGE UP
LEUKOCYTE ESTERASE UR-ACNC: NEGATIVE — SIGNIFICANT CHANGE UP
LYMPHOCYTES # BLD AUTO: 2.04 K/UL — SIGNIFICANT CHANGE UP (ref 1–3.3)
LYMPHOCYTES # BLD AUTO: 25 % — SIGNIFICANT CHANGE UP (ref 13–44)
MCHC RBC-ENTMCNC: 28.9 PG — SIGNIFICANT CHANGE UP (ref 27–34)
MCHC RBC-ENTMCNC: 32.9 % — SIGNIFICANT CHANGE UP (ref 32–36)
MCV RBC AUTO: 88 FL — SIGNIFICANT CHANGE UP (ref 80–100)
MONOCYTES # BLD AUTO: 0.62 K/UL — SIGNIFICANT CHANGE UP (ref 0–0.9)
MONOCYTES NFR BLD AUTO: 7.6 % — SIGNIFICANT CHANGE UP (ref 2–14)
NEUTROPHILS # BLD AUTO: 5.35 K/UL — SIGNIFICANT CHANGE UP (ref 1.8–7.4)
NEUTROPHILS NFR BLD AUTO: 65.6 % — SIGNIFICANT CHANGE UP (ref 43–77)
NITRITE UR-MCNC: NEGATIVE — SIGNIFICANT CHANGE UP
NRBC # FLD: 0 K/UL — SIGNIFICANT CHANGE UP (ref 0–0)
PH UR: 6 — SIGNIFICANT CHANGE UP (ref 5–8)
PLATELET # BLD AUTO: 277 K/UL — SIGNIFICANT CHANGE UP (ref 150–400)
PMV BLD: 10.9 FL — SIGNIFICANT CHANGE UP (ref 7–13)
POTASSIUM SERPL-MCNC: 3.9 MMOL/L — SIGNIFICANT CHANGE UP (ref 3.5–5.3)
POTASSIUM SERPL-SCNC: 3.9 MMOL/L — SIGNIFICANT CHANGE UP (ref 3.5–5.3)
PROT SERPL-MCNC: 6.8 G/DL — SIGNIFICANT CHANGE UP (ref 6–8.3)
PROT UR-MCNC: 50 — SIGNIFICANT CHANGE UP
RBC # BLD: 5.01 M/UL — SIGNIFICANT CHANGE UP (ref 3.8–5.2)
RBC # FLD: 12.8 % — SIGNIFICANT CHANGE UP (ref 10.3–14.5)
RBC CASTS # UR COMP ASSIST: SIGNIFICANT CHANGE UP (ref 0–?)
SALICYLATES SERPL-MCNC: < 5 MG/DL — LOW (ref 15–30)
SODIUM SERPL-SCNC: 141 MMOL/L — SIGNIFICANT CHANGE UP (ref 135–145)
SP GR SPEC: 1.04 — SIGNIFICANT CHANGE UP (ref 1–1.04)
SQUAMOUS # UR AUTO: SIGNIFICANT CHANGE UP
TSH SERPL-MCNC: 3.71 UIU/ML — SIGNIFICANT CHANGE UP (ref 0.27–4.2)
UROBILINOGEN FLD QL: SIGNIFICANT CHANGE UP
WBC # BLD: 8.16 K/UL — SIGNIFICANT CHANGE UP (ref 3.8–10.5)
WBC # FLD AUTO: 8.16 K/UL — SIGNIFICANT CHANGE UP (ref 3.8–10.5)
WBC UR QL: HIGH (ref 0–?)

## 2020-06-30 PROCEDURE — 99285 EMERGENCY DEPT VISIT HI MDM: CPT

## 2020-06-30 RX ORDER — LANOLIN ALCOHOL/MO/W.PET/CERES
3 CREAM (GRAM) TOPICAL AT BEDTIME
Refills: 0 | Status: DISCONTINUED | OUTPATIENT
Start: 2020-07-01 | End: 2020-07-02

## 2020-06-30 RX ORDER — LOSARTAN POTASSIUM 100 MG/1
25 TABLET, FILM COATED ORAL DAILY
Refills: 0 | Status: DISCONTINUED | OUTPATIENT
Start: 2020-07-01 | End: 2020-07-07

## 2020-06-30 RX ORDER — TOPIRAMATE 25 MG
150 TABLET ORAL DAILY
Refills: 0 | Status: DISCONTINUED | OUTPATIENT
Start: 2020-07-01 | End: 2020-07-07

## 2020-06-30 RX ORDER — ASPIRIN/CALCIUM CARB/MAGNESIUM 324 MG
81 TABLET ORAL DAILY
Refills: 0 | Status: DISCONTINUED | OUTPATIENT
Start: 2020-07-01 | End: 2020-07-07

## 2020-06-30 RX ORDER — ATORVASTATIN CALCIUM 80 MG/1
80 TABLET, FILM COATED ORAL AT BEDTIME
Refills: 0 | Status: DISCONTINUED | OUTPATIENT
Start: 2020-07-01 | End: 2020-07-07

## 2020-06-30 RX ORDER — ATENOLOL 25 MG/1
50 TABLET ORAL DAILY
Refills: 0 | Status: DISCONTINUED | OUTPATIENT
Start: 2020-07-01 | End: 2020-07-07

## 2020-06-30 NOTE — ED BEHAVIORAL HEALTH ASSESSMENT NOTE - MEDICAL ISSUES AND PLAN (INCLUDE STANDING AND PRN MEDICATION)
continue medication: aspirin 81 mg qdaily, Atenolol 50mg qdaily, Losartan 25mg qdaily, Atorvastatin 80mg qhs.

## 2020-06-30 NOTE — ED PROVIDER NOTE - CLINICAL SUMMARY MEDICAL DECISION MAKING FREE TEXT BOX
This is  a 64 yr with hx of depression, htn, and hld with c/o sever depression and SI with a plan to od on pills . Labs, psych requested

## 2020-06-30 NOTE — ED BEHAVIORAL HEALTH ASSESSMENT NOTE - SUICIDE RISK FACTORS
Impulsivity/Mood Disorder current/past/Current mood episode/Agitation/Severe Anxiety/Panic/Hopelessness or despair/Insomnia

## 2020-06-30 NOTE — ED BEHAVIORAL HEALTH ASSESSMENT NOTE - DETAILS
pt reports she had 1 sa 3 yrs ago according to the fiance , she had si but never actually attempted. see hpi for current episode of si Dr. PARADA Latuda : worsening of si sister: Bipolar d/o father passed away from [facility] haile is informed and

## 2020-06-30 NOTE — ED BEHAVIORAL HEALTH NOTE - BEHAVIORAL HEALTH NOTE
Writer contacted and spoke with pt’s Faisal dumas, [phone]. Pt’s piter provided the following information:     Pt lives with piter in same apartment as ex-. Pt has no dependents. Pt is unemployed for over 13 years on disability. Pt has hx of Bipolar Disorder and hypertension. Piter reports pt suicidal and manic. Pt said to have been contemplating suicide for the last few days. Pt has hx of past psychiatric hospitalizations. Pt last at [facility] 4 years ago. Hx of SI with no reported attempts. Fiancé reports “contemplating” or saying she wants to go to sleep and not wake up. Pt has reported this last few days. Pt saying she wants to be with father who past from COVID 1 ½ months ago. Pt has been in manic state since February reports buying things online (5k in a month) and put on Latuda by outpatient provider. Pt was on Prozac prior which was d/hallie. Pt will report elated mood and then hours later unable to move from couch with anhedonia. Pt on medication for 3 weeks and then had strong urges to harm self and stomach pains. Pt then taken off Latuda 2 weeks ago. Pt treated by Dr. Philip Johnson in the community. Piter reports that today pt told him that she could not be left alone. Pt reported that if you leave me alone today you will probably come home to me dead. Pt’s provider then contacted and recommended that pt go to ED. Piter reports pt has had days recently that she feels like she cannot go on and wants to be with her dad. Pt recently also having lucid dreams about death involving her mother and father. Pt during night feeling like things are in the room.    No HI intent or plan. No violence or aggression. Pt has become angry feeling as if piter does not understand what she is going through. Pt has no access to firearms. Pt socially uses alcohol with no recent excessive use reported. Pt has sporadic sleep schedule at baseline. Pt recently dieting with no reported concerns for appetite. Pt goes through stages were she doesn’t shower for few days. Pt will shower if going out with energetic mood. Triggers are loss of father to COVID. Feuding with sister with piter recently cutting off contact. Pt and fiancé also living with pt’s ex- which is said to be an added stressor although residing together for the last 13 years. Pt has been planning her and leander wedding since January scheduled for last week now postponed to September. Piter feels pt would benefit from admission. Writer contacted and spoke with pt’s Faisal dumas, [phone]. Pt’s piter provided the following information:     Pt lives with piter in same apartment as ex-. Pt has no dependents. Pt is unemployed for over 13 years on disability. Pt has hx of Bipolar Disorder and hypertension. Piter reports pt suicidal and manic. Pt said to have been contemplating suicide for the last few days. Pt has hx of past psychiatric hospitalizations. Pt last at [facility] 4 years ago. Hx of SI with no reported attempts. Fiancé reports “contemplating” or saying she wants to go to sleep and not wake up. Pt has reported this last few days. Pt saying she wants to be with father who past from COVID 1 ½ months ago. Pt has been in manic state since February reports buying things online (5k in a month) and put on Latuda by outpatient provider. Pt was on Prozac prior which was d/hallie. Pt will report elated mood and then hours later unable to move from couch with anhedonia. Pt on medication for 3 weeks and then had strong urges to harm self and stomach pains. Pt then taken off Latuda 2 weeks ago. Pt treated by Dr. Philip Johnson in the community. Piter reports that today pt told him that she could not be left alone. Pt reported that if you leave me alone today you will probably come home to me dead. Pt’s provider then contacted and recommended that pt go to ED. Piter reports pt has had days recently that she feels like she cannot go on and wants to be with her dad. Pt recently also having lucid dreams about death involving her mother and father. Pt during night feeling like things are in the room.    No HI intent or plan. No violence or aggression. Pt has become angry feeling as if piter does not understand what she is going through. Pt has no access to firearms. Pt socially uses alcohol with no recent excessive use reported. Pt has sporadic sleep schedule at baseline. Pt recently dieting with no reported concerns for appetite. Pt goes through stages were she doesn’t shower for few days. Pt will shower if going out with energetic mood. Triggers are loss of father to COVID. Feuding with sister with piter recently cutting off contact. Pt and fiancé also living with pt’s ex- which is said to be an added stressor although residing together for the last 13 years. Pt has been planning her and leander wedding since January scheduled for last week now postponed to September. Piter feels pt would benefit from admission.    Kennedy informed of admission information to unit 2S at [facility] pending medical clearance.

## 2020-06-30 NOTE — ED ADULT TRIAGE NOTE - CHIEF COMPLAINT QUOTE
alert oriented  states she feels suicidal  wants to take pills   hx bipolar depression  suicide attempts  arrived with haile Malone [phone]

## 2020-06-30 NOTE — ED BEHAVIORAL HEALTH ASSESSMENT NOTE - SUMMARY
Patient is 65 y/o  2x ,  female , domicile with her fiance and her ex-, on disability , with medical hx significant for HTN and HLD, with psychiatric hx of Bipolar d/o , 1 prior psychiatric hospitalization 3 yrs ago at [facility] for sa by OD , 1 sa , in treatment with DR. Johnson  and therapist , Abdelrahman Naranjo , no hx of aggression, no legal hx, no substance use. Pt BIB by her fiance upon recommendation of her psychiatrist for worsening of mood sx and +si.     Patien presents with worsening of mood , labile mood in the past 2 weeks, now presents with 2 days +si with the plan to overdose. Patient is not able to safety plan due to active si and does not feel she can remain safe if discharged she is agreeing to voluntary hospitalization.  Will be admitted to [address] [place] [address].

## 2020-06-30 NOTE — ED PROVIDER NOTE - PROGRESS NOTE DETAILS
DARY: I was signed out this pt pending Covid testing which was NEGATIVE. Will admit to [facility] for SI/depression

## 2020-06-30 NOTE — ED BEHAVIORAL HEALTH ASSESSMENT NOTE - OTHER
psychosocial stressors , and recent passing of her father "up and down" tearful recent passing of her father

## 2020-06-30 NOTE — ED PROVIDER NOTE - OBJECTIVE STATEMENT
This is  a 64 yr with hx of depression, htn, and hld with c/o sever depression and SI with a plan to od on pills . Pt states she feels very overwhelmed, her dad passed away from [place] in April. Her mother thinking her dad visiting her and she states it's really upsetting. She stats 1 of her sister is very narcissistic and does not care about anyone. The other sister wants to ruin her life. Her ex  does not move out of the house what they owned together. And she states her upcoming wedding was cancelled 2 times already. She is stressing about the upcoming move to [place] in October. Pt states currently sees Philip Johnson psychiatrist every 6 wks and see the therapist Abdelrahman Naranjo every wk. Pt endorses previous SI attempt od on pills approximately 3 yrs ago, which she was psychiatrically hospitalized.

## 2020-06-30 NOTE — ED BEHAVIORAL HEALTH ASSESSMENT NOTE - PSYCHIATRIC ISSUES AND PLAN (INCLUDE STANDING AND PRN MEDICATION)
wellbutrin sr 100mg bid, topamax 150mg qdaily, Ativan 2mg bid. For agitation: Ativan 2mg po/im q6hrs prn , for insomnia: melatonin 3mg qhs

## 2020-06-30 NOTE — ED BEHAVIORAL HEALTH ASSESSMENT NOTE - RISK ASSESSMENT
pt is actively suicidal, with active mood sx, requiring inpatient level of care due to acute safety concerns. High Acute Suicide Risk

## 2020-06-30 NOTE — ED BEHAVIORAL HEALTH ASSESSMENT NOTE - HPI (INCLUDE ILLNESS QUALITY, SEVERITY, DURATION, TIMING, CONTEXT, MODIFYING FACTORS, ASSOCIATED SIGNS AND SYMPTOMS)
Patient is 63 y/o  2x ,  female , domicile with her fiance and her ex-, on disability , with medical hx significant for HTN and HLD, with psychiatric hx of Bipolar d/o , 1 prior psychiatric hospitalization 3 yrs ago at [facility] for sa by OD , 1 sa , in treatment with DR. Johnson  and therapist , Abdelrahman Naranjo , no hx of aggression, no legal hx, no substance use. Pt BIB by her fiance upon recommendation of her psychiatrist for worsening of mood sx and +si.     On assessment pt is cooperative but tearful throughout the interview. She reports multiple stressors in recent months including passing away of her father in April from [employer], pt's mother is not coping well with pt's father's death , pt's  wedding cancelled last week due to family not being able to attend , and upcoming move in October to [place]. Patient reports her mood has been fluctuating from feeling elevated to feeling down and anxious , in the past few weeks. She reports having manic sx along with depressive sx. Recently as of 2 weeks ago has spent 7,000$ on things she did not need , her thoughts are constantly racing , she is sleeping only couple of hours /night. She reports she was on Latuda 2 weeks ago but was stopped because she felt it was making her suicidal. Patient reports in the past 2 days she the si has been intensifying, constant, and does not feel she can remain  safe, has been wanting to overdose on the bottle of previously prescribed Klonopin since yesterday . She informed her fiance of this and he removed all the bottles. She reports she continues to have currently si , stating "I don't want to live , I want to be with my father , I have too much on my plate" and states if she was given the opportunity to overdose she would do it. . Patient is endorsing low energy, hopelessness. She reports "ok" appetite although gained weight in the past few months. Patient is reporting also increased anxiety over the past week. No psychotic sx, denies any drug use.     as per Dr. Johnson phone call earlier today provided the following information "pt  with hx of Bipolar d/o, recently with brief manic episodes, spending a lot of money . Every so often brief depression . Today pt called saying she has si whole day with the plan to take pills. He reports she seems histrionic . Past hospitalization at [facility] . No substance use. Medications: Wellbutrin  qdaily , Topamax 150mg qdaily , Ativan 2mg bid. Pt just came off Latuda , she felt it was making her suicidal.  see  note for collateral from pt's fiance.

## 2020-06-30 NOTE — ED ADULT NURSE NOTE - OBJECTIVE STATEMENT
Pt received to [facility]. Pt presents calm and cooperative; states she has been feeling suicidal with a plan to overdose because of "so much stress in her life. Pt cites her wedding has been postponed x2 due to Covid, her father passed away in April and also that her sister is "trying to control her". Pt denies HI; pts belongings secured for safety. Pt awaiting psychiatric evaluation.

## 2020-06-30 NOTE — ED ADULT NURSE NOTE - CHIEF COMPLAINT QUOTE
alert oriented  states she feels suicidal  wants to take pills   hx bipolar depression  suicide attempts  arrived with haile Malone [phone]

## 2020-06-30 NOTE — ED BEHAVIORAL HEALTH ASSESSMENT NOTE - DESCRIPTION
Patient is anxious , tearful but cooperative , did not require any prns.   Vital Signs Last 24 Hrs  T(C): 36.9 (30 Jun 2020 21:11), Max: 36.9 (30 Jun 2020 21:11)  T(F): 98.5 (30 Jun 2020 21:11), Max: 98.5 (30 Jun 2020 21:11)  HR: 71 (30 Jun 2020 21:11) (71 - 71)  BP: 161/101 (30 Jun 2020 21:11) (161/101 - 161/101)  BP(mean): --  RR: 16 (30 Jun 2020 21:11) (16 - 16)  SpO2: 96% (30 Jun 2020 21:11) (96% - 96%) HTN, HLD on disability for Bipoalr d/o , divroced 2x, currently engaged. No children.

## 2020-06-30 NOTE — ED BEHAVIORAL HEALTH ASSESSMENT NOTE - CURRENT MEDICATION
wellbutrin sr 100mg bid, topamax 150mg qdaily, Ativan 2mg bid  aspirin 81 mg qdaily, Atenolol 50mg qdaily, Losartan 25mg qdaily, Atorvastatin 80mg qhs.

## 2020-07-01 DIAGNOSIS — F33.9 MAJOR DEPRESSIVE DISORDER, RECURRENT, UNSPECIFIED: ICD-10-CM

## 2020-07-01 PROBLEM — I10 ESSENTIAL (PRIMARY) HYPERTENSION: Chronic | Status: ACTIVE | Noted: 2019-08-06

## 2020-07-01 PROBLEM — E78.5 HYPERLIPIDEMIA, UNSPECIFIED: Chronic | Status: ACTIVE | Noted: 2019-08-06

## 2020-07-01 PROBLEM — F31.9 BIPOLAR DISORDER, UNSPECIFIED: Chronic | Status: ACTIVE | Noted: 2019-08-06

## 2020-07-01 LAB
AMPHET UR-MCNC: POSITIVE — SIGNIFICANT CHANGE UP
BARBITURATES UR SCN-MCNC: NEGATIVE — SIGNIFICANT CHANGE UP
BENZODIAZ UR-MCNC: NEGATIVE — SIGNIFICANT CHANGE UP
CANNABINOIDS UR-MCNC: NEGATIVE — SIGNIFICANT CHANGE UP
COCAINE METAB.OTHER UR-MCNC: NEGATIVE — SIGNIFICANT CHANGE UP
METHADONE UR-MCNC: NEGATIVE — SIGNIFICANT CHANGE UP
OPIATES UR-MCNC: NEGATIVE — SIGNIFICANT CHANGE UP
OXYCODONE UR-MCNC: NEGATIVE — SIGNIFICANT CHANGE UP
PCP UR-MCNC: NEGATIVE — SIGNIFICANT CHANGE UP
SARS-COV-2 IGG SERPL QL IA: NEGATIVE — SIGNIFICANT CHANGE UP
SARS-COV-2 IGM SERPL IA-ACNC: <3.8 AU/ML — SIGNIFICANT CHANGE UP
SARS-COV-2 RNA SPEC QL NAA+PROBE: SIGNIFICANT CHANGE UP

## 2020-07-01 PROCEDURE — 73100 X-RAY EXAM OF WRIST: CPT | Mod: 26,LT

## 2020-07-01 PROCEDURE — 99222 1ST HOSP IP/OBS MODERATE 55: CPT

## 2020-07-01 RX ORDER — BUPROPION HYDROCHLORIDE 150 MG/1
200 TABLET, EXTENDED RELEASE ORAL
Refills: 0 | Status: DISCONTINUED | OUTPATIENT
Start: 2020-07-01 | End: 2020-07-02

## 2020-07-01 RX ORDER — QUETIAPINE FUMARATE 200 MG/1
25 TABLET, FILM COATED ORAL
Refills: 0 | Status: DISCONTINUED | OUTPATIENT
Start: 2020-07-01 | End: 2020-07-02

## 2020-07-01 RX ORDER — QUETIAPINE FUMARATE 200 MG/1
25 TABLET, FILM COATED ORAL ONCE
Refills: 0 | Status: COMPLETED | OUTPATIENT
Start: 2020-07-01 | End: 2020-07-01

## 2020-07-01 RX ORDER — IBUPROFEN 200 MG
400 TABLET ORAL ONCE
Refills: 0 | Status: COMPLETED | OUTPATIENT
Start: 2020-07-01 | End: 2020-07-01

## 2020-07-01 RX ADMIN — Medication 150 MILLIGRAM(S): at 10:13

## 2020-07-01 RX ADMIN — Medication 2 MILLIGRAM(S): at 10:27

## 2020-07-01 RX ADMIN — LOSARTAN POTASSIUM 25 MILLIGRAM(S): 100 TABLET, FILM COATED ORAL at 10:13

## 2020-07-01 RX ADMIN — ATORVASTATIN CALCIUM 80 MILLIGRAM(S): 80 TABLET, FILM COATED ORAL at 21:00

## 2020-07-01 RX ADMIN — BUPROPION HYDROCHLORIDE 200 MILLIGRAM(S): 150 TABLET, EXTENDED RELEASE ORAL at 10:13

## 2020-07-01 RX ADMIN — ATENOLOL 50 MILLIGRAM(S): 25 TABLET ORAL at 10:13

## 2020-07-01 RX ADMIN — BUPROPION HYDROCHLORIDE 200 MILLIGRAM(S): 150 TABLET, EXTENDED RELEASE ORAL at 21:00

## 2020-07-01 RX ADMIN — QUETIAPINE FUMARATE 25 MILLIGRAM(S): 200 TABLET, FILM COATED ORAL at 13:10

## 2020-07-01 RX ADMIN — Medication 81 MILLIGRAM(S): at 10:13

## 2020-07-01 RX ADMIN — Medication 1 MILLIGRAM(S): at 05:55

## 2020-07-01 RX ADMIN — QUETIAPINE FUMARATE 25 MILLIGRAM(S): 200 TABLET, FILM COATED ORAL at 21:01

## 2020-07-01 RX ADMIN — Medication 400 MILLIGRAM(S): at 10:27

## 2020-07-02 LAB — SARS-COV-2 RNA SPEC QL NAA+PROBE: SIGNIFICANT CHANGE UP

## 2020-07-02 PROCEDURE — 99232 SBSQ HOSP IP/OBS MODERATE 35: CPT

## 2020-07-02 PROCEDURE — 99222 1ST HOSP IP/OBS MODERATE 55: CPT

## 2020-07-02 RX ORDER — QUETIAPINE FUMARATE 200 MG/1
75 TABLET, FILM COATED ORAL AT BEDTIME
Refills: 0 | Status: COMPLETED | OUTPATIENT
Start: 2020-07-03 | End: 2020-07-03

## 2020-07-02 RX ORDER — BUPROPION HYDROCHLORIDE 150 MG/1
300 TABLET, EXTENDED RELEASE ORAL DAILY
Refills: 0 | Status: DISCONTINUED | OUTPATIENT
Start: 2020-07-02 | End: 2020-07-07

## 2020-07-02 RX ORDER — QUETIAPINE FUMARATE 200 MG/1
100 TABLET, FILM COATED ORAL AT BEDTIME
Refills: 0 | Status: DISCONTINUED | OUTPATIENT
Start: 2020-07-04 | End: 2020-07-04

## 2020-07-02 RX ORDER — QUETIAPINE FUMARATE 200 MG/1
50 TABLET, FILM COATED ORAL DAILY
Refills: 0 | Status: DISCONTINUED | OUTPATIENT
Start: 2020-07-02 | End: 2020-07-06

## 2020-07-02 RX ORDER — CARIPRAZINE 1.5 MG/1
1 CAPSULE, GELATIN COATED ORAL
Qty: 30 | Refills: 0
Start: 2020-07-02 | End: 2020-07-31

## 2020-07-02 RX ORDER — QUETIAPINE FUMARATE 200 MG/1
50 TABLET, FILM COATED ORAL
Refills: 0 | Status: DISCONTINUED | OUTPATIENT
Start: 2020-07-02 | End: 2020-07-02

## 2020-07-02 RX ORDER — PANTOPRAZOLE SODIUM 20 MG/1
40 TABLET, DELAYED RELEASE ORAL
Refills: 0 | Status: DISCONTINUED | OUTPATIENT
Start: 2020-07-02 | End: 2020-07-07

## 2020-07-02 RX ORDER — QUETIAPINE FUMARATE 200 MG/1
50 TABLET, FILM COATED ORAL AT BEDTIME
Refills: 0 | Status: COMPLETED | OUTPATIENT
Start: 2020-07-02 | End: 2020-07-02

## 2020-07-02 RX ORDER — IBUPROFEN 200 MG
400 TABLET ORAL EVERY 6 HOURS
Refills: 0 | Status: DISCONTINUED | OUTPATIENT
Start: 2020-07-02 | End: 2020-07-07

## 2020-07-02 RX ADMIN — BUPROPION HYDROCHLORIDE 300 MILLIGRAM(S): 150 TABLET, EXTENDED RELEASE ORAL at 09:35

## 2020-07-02 RX ADMIN — Medication 81 MILLIGRAM(S): at 09:34

## 2020-07-02 RX ADMIN — QUETIAPINE FUMARATE 50 MILLIGRAM(S): 200 TABLET, FILM COATED ORAL at 09:35

## 2020-07-02 RX ADMIN — Medication 2 MILLIGRAM(S): at 09:35

## 2020-07-02 RX ADMIN — ATENOLOL 50 MILLIGRAM(S): 25 TABLET ORAL at 09:35

## 2020-07-02 RX ADMIN — LOSARTAN POTASSIUM 25 MILLIGRAM(S): 100 TABLET, FILM COATED ORAL at 09:35

## 2020-07-02 RX ADMIN — Medication 1 MILLIGRAM(S): at 13:14

## 2020-07-02 RX ADMIN — ATORVASTATIN CALCIUM 80 MILLIGRAM(S): 80 TABLET, FILM COATED ORAL at 20:44

## 2020-07-02 RX ADMIN — QUETIAPINE FUMARATE 50 MILLIGRAM(S): 200 TABLET, FILM COATED ORAL at 20:44

## 2020-07-02 RX ADMIN — Medication 400 MILLIGRAM(S): at 13:15

## 2020-07-02 RX ADMIN — Medication 1 MILLIGRAM(S): at 20:44

## 2020-07-02 RX ADMIN — Medication 150 MILLIGRAM(S): at 09:35

## 2020-07-02 NOTE — CONSULT NOTE ADULT - SUBJECTIVE AND OBJECTIVE BOX
Pt seen and examined with a female RN, Berta     HPI: 64 y.o F with h/o bipolar, HTN, HLD admitted for depression. Pt c/o left hand pain X > 2 weeks, no swelling or erythema, no h/o injury, pt denies any cough, SOB, fever or chills.     PAST MEDICAL & SURGICAL HISTORY:  HLD (hyperlipidemia)  HTN (hypertension)  Depression  Bipolar disorder  HTN (hypertension)  HLD (hyperlipidemia)  No significant past surgical history  No significant past surgical history      Review of Systems:   CONSTITUTIONAL: No fever, weight loss, or fatigue  EYES: No eye pain, visual disturbances, or discharge  ENMT:  No difficulty hearing, tinnitus, vertigo; No sinus or throat pain  NECK: No pain or stiffness  RESPIRATORY: No cough, wheezing, chills or hemoptysis; No shortness of breath  CARDIOVASCULAR: No chest pain, palpitations, dizziness, or leg swelling  GASTROINTESTINAL: No abdominal or epigastric pain. No nausea, vomiting, or hematemesis; No diarrhea or constipation. No melena or hematochezia.  GENITOURINARY: No dysuria, frequency, hematuria, or incontinence  NEUROLOGICAL: No headaches, memory loss, loss of strength, numbness, or tremors  SKIN: No itching, burning, rashes, or lesions   LYMPH NODES: No enlarged glands  ENDOCRINE: No heat or cold intolerance; No hair loss  MUSCULOSKELETAL: No joint pain or swelling; No muscle, back, pain, (+) left hand pain as above.   HEME/LYMPH: No easy bruising, or bleeding gums  ALLERY AND IMMUNOLOGIC: No hives or eczema    Allergies    penicillin (Rash)    Intolerances        Social History: denies any h/o tobacco, alcohol or illicit drug abuse    FAMILY HISTORY:  No pertinent family history in first degree relatives  No pertinent family history in first degree relatives      MEDICATIONS  (STANDING):  aspirin  chewable 81 milliGRAM(s) Oral daily  ATENolol  Tablet 50 milliGRAM(s) Oral daily  atorvastatin 80 milliGRAM(s) Oral at bedtime  buPROPion  milliGRAM(s) Oral daily  LORazepam     Tablet 1 milliGRAM(s) Oral three times a day  losartan 25 milliGRAM(s) Oral daily  QUEtiapine 50 milliGRAM(s) Oral <User Schedule>  topiramate 150 milliGRAM(s) Oral daily    MEDICATIONS  (PRN):  LORazepam     Tablet 1 milliGRAM(s) Oral every 6 hours PRN Agitation  LORazepam   Injectable 2 milliGRAM(s) IntraMuscular every 6 hours PRN Agitation  melatonin 3 milliGRAM(s) Oral at bedtime PRN Insomnia      Vital Signs Last 24 Hrs  T(C): 36.3 (2020 06:55), Max: 36.7 (2020 14:33)  T(F): 97.4 (2020 06:55), Max: 98.1 (2020 18:50)  HR: --66  BP: --122/65  BP(mean): --  RR: --  SpO2: --  CAPILLARY BLOOD GLUCOSE            PHYSICAL EXAM:  GENERAL: NAD, well-developed  HEAD:  Atraumatic, Normocephalic  EYES: EOMI, conjunctiva and sclera clear  NECK: Supple, No JVD  CHEST/LUNG: Clear to auscultation bilaterally; No wheeze  HEART: Regular rate and rhythm; No murmurs, rubs, or gallops  ABDOMEN: Soft, Nontender, Nondistended; Bowel sounds present  EXTREMITIES:  2+ Peripheral Pulses, No clubbing, cyanosis, or edema, left hand: no swelling, no erythema, (+) full range of motion. (+) mild tenderness at dossal hand    NEUROLOGY: non-focal  SKIN: No rashes or lesions    LABS:                        14.5   8.16  )-----------( 277      ( 2020 22:20 )             44.1     06-30    141  |  105  |  30<H>  ----------------------------<  83  3.9   |  21<L>  |  1.06    Ca    9.4      2020 22:20    TPro  6.8  /  Alb  4.1  /  TBili  0.3  /  DBili  x   /  AST  17  /  ALT  11  /  AlkPhos  86  06-30          Urinalysis Basic - ( 2020 23:10 )    Color: YELLOW / Appearance: CLEAR / S.036 / pH: 6.0  Gluc: NEGATIVE / Ketone: NEGATIVE  / Bili: NEGATIVE / Urobili: TRACE   Blood: NEGATIVE / Protein: 50 / Nitrite: NEGATIVE   Leuk Esterase: NEGATIVE / RBC: 0-2 / WBC 6-10   Sq Epi: FEW / Non Sq Epi: x / Bacteria: NEGATIVE        EKG(personally reviewed):    RADIOLOGY & ADDITIONAL TESTS:    Imaging Personally Reviewed:  < from: Xray Wrist 2 Views, Left (20 @ 16:30) >  EXAM:  RAD WRIST LT        PROCEDURE DATE:  2020         INTERPRETATION:  CLINICAL INDICATION: atraumatic left wrist pain for approximately 3 weeks    EXAM:  Frontal, oblique, and lateral left wrist from 2020 at 1630. No similar prior studies available for comparison.    IMPRESSION:  No fractures or dislocations.    Preserved joint spaces and no joint margin erosions.    Carpal bones normally aligned.    Neutral ulnar variance.    No lytic or blastic lesions.                  BECKIE BRANDON M.D., ATTENDING RADIOLOGIST  This document has been electronically signed. 2020  5:30PM    < end of copied text >    Consultant(s) Notes Reviewed:      Care Discussed with Consultants/Other Providers:

## 2020-07-02 NOTE — CONSULT NOTE ADULT - ASSESSMENT
64 y.o. F with h/o HTN, HLD, Bipolar, no h/o covid contact, Covid negative in ED, now c/o left hand pain X >2 weeks. Also roomate has cough, being tested for Covid. Pt denies any cough, sore throat. No fever or chills.     1. Roomate being tested for Covid (cough). No clinical symptoms of Covid infection.  -Agree with Covid testing   -Social distancing and mask   -Will need quarantine as if roomate tested as per policy (+) for Covid.     2. hand pain: X-ray not remarkable, no signs of injury or infection. Unclear if pt had injury before (pt denies)  -Agree with NSAIDS PRN  -Warm compression 2X/day  PPI for GI prophylaxis     3. HTN. BP well controlled.  -Cont Atenolol and Losartan  -Monitor BP    4. HLD  -Cont Statin    5. Bipolar d/o  -Management as per Psych 64 y.o. F with h/o HTN, HLD, Bipolar, no h/o covid contact, Covid negative in ED, now c/o left hand pain X >2 weeks. Also roomate has cough, being tested for Covid. Pt denies any cough, sore throat. No fever or chills.     1. Roomate being tested for Covid (cough). No clinical symptoms of Covid infection.  -Agree with Covid testing   -Social distancing and mask   -Will need quarantine as if roomate tested positive as per policy (+) for Covid.     2. hand pain: X-ray not remarkable, no signs of injury or infection. Unclear if pt had injury before (pt denies)  -Agree with NSAIDS PRN  -Warm compression 2X/day  PPI for GI prophylaxis     3. HTN. BP well controlled.  -Cont Atenolol and Losartan  -Monitor BP    4. HLD  -Cont Statin    5. Bipolar d/o  -Management as per Psych
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
## 2020-07-03 PROCEDURE — 99231 SBSQ HOSP IP/OBS SF/LOW 25: CPT

## 2020-07-03 RX ADMIN — ATENOLOL 50 MILLIGRAM(S): 25 TABLET ORAL at 09:17

## 2020-07-03 RX ADMIN — Medication 1 MILLIGRAM(S): at 09:18

## 2020-07-03 RX ADMIN — LOSARTAN POTASSIUM 25 MILLIGRAM(S): 100 TABLET, FILM COATED ORAL at 09:18

## 2020-07-03 RX ADMIN — QUETIAPINE FUMARATE 75 MILLIGRAM(S): 200 TABLET, FILM COATED ORAL at 21:33

## 2020-07-03 RX ADMIN — QUETIAPINE FUMARATE 50 MILLIGRAM(S): 200 TABLET, FILM COATED ORAL at 09:18

## 2020-07-03 RX ADMIN — BUPROPION HYDROCHLORIDE 300 MILLIGRAM(S): 150 TABLET, EXTENDED RELEASE ORAL at 09:17

## 2020-07-03 RX ADMIN — Medication 1 MILLIGRAM(S): at 21:33

## 2020-07-03 RX ADMIN — Medication 150 MILLIGRAM(S): at 09:18

## 2020-07-03 RX ADMIN — PANTOPRAZOLE SODIUM 40 MILLIGRAM(S): 20 TABLET, DELAYED RELEASE ORAL at 06:29

## 2020-07-03 RX ADMIN — ATORVASTATIN CALCIUM 80 MILLIGRAM(S): 80 TABLET, FILM COATED ORAL at 21:33

## 2020-07-03 RX ADMIN — Medication 81 MILLIGRAM(S): at 09:17

## 2020-07-03 RX ADMIN — Medication 1 MILLIGRAM(S): at 12:57

## 2020-07-04 PROCEDURE — 99232 SBSQ HOSP IP/OBS MODERATE 35: CPT

## 2020-07-04 RX ORDER — QUETIAPINE FUMARATE 200 MG/1
50 TABLET, FILM COATED ORAL AT BEDTIME
Refills: 0 | Status: DISCONTINUED | OUTPATIENT
Start: 2020-07-04 | End: 2020-07-06

## 2020-07-04 RX ADMIN — QUETIAPINE FUMARATE 50 MILLIGRAM(S): 200 TABLET, FILM COATED ORAL at 10:46

## 2020-07-04 RX ADMIN — Medication 1 MILLIGRAM(S): at 20:57

## 2020-07-04 RX ADMIN — Medication 81 MILLIGRAM(S): at 10:46

## 2020-07-04 RX ADMIN — Medication 0.5 MILLIGRAM(S): at 15:39

## 2020-07-04 RX ADMIN — Medication 1 MILLIGRAM(S): at 10:45

## 2020-07-04 RX ADMIN — Medication 150 MILLIGRAM(S): at 10:46

## 2020-07-04 RX ADMIN — BUPROPION HYDROCHLORIDE 300 MILLIGRAM(S): 150 TABLET, EXTENDED RELEASE ORAL at 10:45

## 2020-07-04 RX ADMIN — QUETIAPINE FUMARATE 50 MILLIGRAM(S): 200 TABLET, FILM COATED ORAL at 20:57

## 2020-07-04 RX ADMIN — ATORVASTATIN CALCIUM 80 MILLIGRAM(S): 80 TABLET, FILM COATED ORAL at 20:57

## 2020-07-04 RX ADMIN — PANTOPRAZOLE SODIUM 40 MILLIGRAM(S): 20 TABLET, DELAYED RELEASE ORAL at 07:08

## 2020-07-05 LAB
ANION GAP SERPL CALC-SCNC: 11 MMO/L — SIGNIFICANT CHANGE UP (ref 7–14)
BUN SERPL-MCNC: 22 MG/DL — SIGNIFICANT CHANGE UP (ref 7–23)
CALCIUM SERPL-MCNC: 8.8 MG/DL — SIGNIFICANT CHANGE UP (ref 8.4–10.5)
CHLORIDE SERPL-SCNC: 108 MMOL/L — HIGH (ref 98–107)
CO2 SERPL-SCNC: 22 MMOL/L — SIGNIFICANT CHANGE UP (ref 22–31)
CREAT SERPL-MCNC: 1.12 MG/DL — SIGNIFICANT CHANGE UP (ref 0.5–1.3)
GLUCOSE SERPL-MCNC: 95 MG/DL — SIGNIFICANT CHANGE UP (ref 70–99)
POTASSIUM SERPL-MCNC: 3.7 MMOL/L — SIGNIFICANT CHANGE UP (ref 3.5–5.3)
POTASSIUM SERPL-SCNC: 3.7 MMOL/L — SIGNIFICANT CHANGE UP (ref 3.5–5.3)
SODIUM SERPL-SCNC: 141 MMOL/L — SIGNIFICANT CHANGE UP (ref 135–145)

## 2020-07-05 PROCEDURE — 99231 SBSQ HOSP IP/OBS SF/LOW 25: CPT

## 2020-07-05 RX ADMIN — LOSARTAN POTASSIUM 25 MILLIGRAM(S): 100 TABLET, FILM COATED ORAL at 09:03

## 2020-07-05 RX ADMIN — Medication 1 MILLIGRAM(S): at 09:03

## 2020-07-05 RX ADMIN — PANTOPRAZOLE SODIUM 40 MILLIGRAM(S): 20 TABLET, DELAYED RELEASE ORAL at 06:35

## 2020-07-05 RX ADMIN — Medication 0.5 MILLIGRAM(S): at 13:00

## 2020-07-05 RX ADMIN — BUPROPION HYDROCHLORIDE 300 MILLIGRAM(S): 150 TABLET, EXTENDED RELEASE ORAL at 09:03

## 2020-07-05 RX ADMIN — ATENOLOL 50 MILLIGRAM(S): 25 TABLET ORAL at 09:03

## 2020-07-05 RX ADMIN — QUETIAPINE FUMARATE 50 MILLIGRAM(S): 200 TABLET, FILM COATED ORAL at 09:03

## 2020-07-05 RX ADMIN — Medication 1 MILLIGRAM(S): at 21:52

## 2020-07-05 RX ADMIN — Medication 81 MILLIGRAM(S): at 09:03

## 2020-07-05 RX ADMIN — QUETIAPINE FUMARATE 50 MILLIGRAM(S): 200 TABLET, FILM COATED ORAL at 21:52

## 2020-07-05 RX ADMIN — ATORVASTATIN CALCIUM 80 MILLIGRAM(S): 80 TABLET, FILM COATED ORAL at 21:52

## 2020-07-05 RX ADMIN — Medication 150 MILLIGRAM(S): at 09:03

## 2020-07-06 PROCEDURE — 99232 SBSQ HOSP IP/OBS MODERATE 35: CPT

## 2020-07-06 RX ORDER — QUETIAPINE FUMARATE 200 MG/1
50 TABLET, FILM COATED ORAL THREE TIMES A DAY
Refills: 0 | Status: DISCONTINUED | OUTPATIENT
Start: 2020-07-06 | End: 2020-07-07

## 2020-07-06 RX ADMIN — QUETIAPINE FUMARATE 50 MILLIGRAM(S): 200 TABLET, FILM COATED ORAL at 20:56

## 2020-07-06 RX ADMIN — Medication 81 MILLIGRAM(S): at 10:09

## 2020-07-06 RX ADMIN — ATORVASTATIN CALCIUM 80 MILLIGRAM(S): 80 TABLET, FILM COATED ORAL at 20:56

## 2020-07-06 RX ADMIN — QUETIAPINE FUMARATE 50 MILLIGRAM(S): 200 TABLET, FILM COATED ORAL at 12:58

## 2020-07-06 RX ADMIN — Medication 2 MILLIGRAM(S): at 20:56

## 2020-07-06 RX ADMIN — LOSARTAN POTASSIUM 25 MILLIGRAM(S): 100 TABLET, FILM COATED ORAL at 10:10

## 2020-07-06 RX ADMIN — Medication 1 MILLIGRAM(S): at 10:10

## 2020-07-06 RX ADMIN — Medication 150 MILLIGRAM(S): at 10:10

## 2020-07-06 RX ADMIN — BUPROPION HYDROCHLORIDE 300 MILLIGRAM(S): 150 TABLET, EXTENDED RELEASE ORAL at 10:10

## 2020-07-06 RX ADMIN — QUETIAPINE FUMARATE 50 MILLIGRAM(S): 200 TABLET, FILM COATED ORAL at 10:10

## 2020-07-06 RX ADMIN — PANTOPRAZOLE SODIUM 40 MILLIGRAM(S): 20 TABLET, DELAYED RELEASE ORAL at 07:15

## 2020-07-06 RX ADMIN — ATENOLOL 50 MILLIGRAM(S): 25 TABLET ORAL at 10:10

## 2020-07-06 NOTE — PROGRESS NOTE ADULT - ASSESSMENT
64 y.o. F with h/o HTN, HLD, Bipolar, admitted for bipolar d/o.    1. hand pain: X-ray not remarkable, no signs of injury or infection. Unclear if pt had injury before (pt denies)  -cont.  NSAIDS PRN  -cont. Warm compression 2X/day  -cont. PPI for GI prophylaxis   -F/U with PCP after discharge    3. HTN. BP well controlled.  -Cont Atenolol and Losartan  -Monitor BP    4. HLD  -Cont Statin    5. Bipolar d/o  -Management as per Psych

## 2020-07-06 NOTE — PROGRESS NOTE ADULT - SUBJECTIVE AND OBJECTIVE BOX
CC/Reason for Consult: HTN  Pt seen and examined with a female RN Nnsarina      SUBJECTIVE / OVERNIGHT EVENTS: Pt has no coomplaints except mild left hand pain. Denies any dizziness     MEDICATIONS  (STANDING):  aspirin  chewable 81 milliGRAM(s) Oral daily  ATENolol  Tablet 50 milliGRAM(s) Oral daily  atorvastatin 80 milliGRAM(s) Oral at bedtime  buPROPion  milliGRAM(s) Oral daily  LORazepam     Tablet 2 milliGRAM(s) Oral at bedtime  LORazepam     Tablet 1 milliGRAM(s) Oral daily  losartan 25 milliGRAM(s) Oral daily  pantoprazole    Tablet 40 milliGRAM(s) Oral before breakfast  QUEtiapine 50 milliGRAM(s) Oral three times a day  topiramate 150 milliGRAM(s) Oral daily    MEDICATIONS  (PRN):  ibuprofen  Tablet. 400 milliGRAM(s) Oral every 6 hours PRN Mild Pain (1 - 3), Moderate Pain (4 - 6)  LORazepam     Tablet 1 milliGRAM(s) Oral every 6 hours PRN anxiety or insomnia  LORazepam   Injectable 2 milliGRAM(s) IntraMuscular every 6 hours PRN Agitation      Vital Signs Last 24 Hrs  T(C): 36.7 (06 Jul 2020 10:45), Max: 37 (06 Jul 2020 06:44)  T(F): 98.1 (06 Jul 2020 10:45), Max: 98.6 (06 Jul 2020 06:44)  HR: --85  BP: --122/69  BP(mean): --  RR: --  SpO2: --  CAPILLARY BLOOD GLUCOSE            PHYSICAL EXAM:  GENERAL: NAD, well-developed  HEAD:  Atraumatic, Normocephalic  EYES: EOMI, conjunctiva and sclera clear  NECK: Supple, No JVD  CHEST/LUNG: Clear to auscultation bilaterally; No wheeze  HEART: Regular rate and rhythm; No murmurs, rubs, or gallops  ABDOMEN: Soft, Nontender, Nondistended; Bowel sounds present  EXTREMITIES:  2+ Peripheral Pulses, No clubbing, cyanosis, or edema. (+) mild tenderness at dorsal aspect of left hand, no erythema   PSYCH: AAOx3  NEUROLOGY: non-focal  SKIN: No rashes or lesions    LABS:    07-05    141  |  108<H>  |  22  ----------------------------<  95  3.7   |  22  |  1.12    Ca    8.8      05 Jul 2020 08:58                RADIOLOGY & ADDITIONAL TESTS:    Imaging Personally Reviewed:    Consultant(s) Notes Reviewed:      Care Discussed with Consultants/Other Providers:

## 2020-07-07 VITALS — TEMPERATURE: 98 F

## 2020-07-07 LAB
CHOLEST SERPL-MCNC: 181 MG/DL — SIGNIFICANT CHANGE UP (ref 120–199)
FOLATE SERPL-MCNC: 10.2 NG/ML — SIGNIFICANT CHANGE UP (ref 4.7–20)
HBA1C BLD-MCNC: 5.1 % — SIGNIFICANT CHANGE UP (ref 4–5.6)
HDLC SERPL-MCNC: 36 MG/DL — LOW (ref 45–65)
LIPID PNL WITH DIRECT LDL SERPL: 137 MG/DL — SIGNIFICANT CHANGE UP
TRIGL SERPL-MCNC: 117 MG/DL — SIGNIFICANT CHANGE UP (ref 10–149)
VIT B12 SERPL-MCNC: 349 PG/ML — SIGNIFICANT CHANGE UP (ref 200–900)
VIT D25+D1,25 OH+D1,25 PNL SERPL-MCNC: 40.3 PG/ML — SIGNIFICANT CHANGE UP (ref 19.9–79.3)

## 2020-07-07 PROCEDURE — 99232 SBSQ HOSP IP/OBS MODERATE 35: CPT

## 2020-07-07 RX ORDER — AMLODIPINE BESYLATE 2.5 MG/1
1 TABLET ORAL
Qty: 0 | Refills: 0 | DISCHARGE

## 2020-07-07 RX ORDER — QUETIAPINE FUMARATE 200 MG/1
1 TABLET, FILM COATED ORAL
Qty: 42 | Refills: 0
Start: 2020-07-07 | End: 2020-07-20

## 2020-07-07 RX ORDER — BUPROPION HYDROCHLORIDE 150 MG/1
1 TABLET, EXTENDED RELEASE ORAL
Qty: 0 | Refills: 0 | DISCHARGE

## 2020-07-07 RX ORDER — BUPROPION HYDROCHLORIDE 150 MG/1
1 TABLET, EXTENDED RELEASE ORAL
Qty: 14 | Refills: 0
Start: 2020-07-07 | End: 2020-07-20

## 2020-07-07 RX ORDER — ATORVASTATIN CALCIUM 80 MG/1
1 TABLET, FILM COATED ORAL
Qty: 0 | Refills: 0 | DISCHARGE

## 2020-07-07 RX ORDER — ATORVASTATIN CALCIUM 80 MG/1
1 TABLET, FILM COATED ORAL
Qty: 30 | Refills: 0
Start: 2020-07-07 | End: 2020-08-05

## 2020-07-07 RX ORDER — LISINOPRIL 2.5 MG/1
1 TABLET ORAL
Qty: 30 | Refills: 0
Start: 2020-07-07 | End: 2020-08-05

## 2020-07-07 RX ORDER — TRAZODONE HCL 50 MG
2 TABLET ORAL
Qty: 0 | Refills: 0 | DISCHARGE

## 2020-07-07 RX ORDER — OLANZAPINE 15 MG/1
1 TABLET, FILM COATED ORAL
Qty: 0 | Refills: 0 | DISCHARGE

## 2020-07-07 RX ORDER — TOPIRAMATE 25 MG
3 TABLET ORAL
Qty: 42 | Refills: 0
Start: 2020-07-07 | End: 2020-07-20

## 2020-07-07 RX ORDER — LISINOPRIL 2.5 MG/1
1 TABLET ORAL
Qty: 0 | Refills: 0 | DISCHARGE

## 2020-07-07 RX ORDER — TOPIRAMATE 25 MG
1 TABLET ORAL
Qty: 0 | Refills: 0 | DISCHARGE

## 2020-07-07 RX ORDER — FLUOXETINE HCL 10 MG
1 CAPSULE ORAL
Qty: 0 | Refills: 0 | DISCHARGE

## 2020-07-07 RX ORDER — ATENOLOL 25 MG/1
1 TABLET ORAL
Qty: 0 | Refills: 0 | DISCHARGE

## 2020-07-07 RX ORDER — ASPIRIN/CALCIUM CARB/MAGNESIUM 324 MG
1 TABLET ORAL
Qty: 30 | Refills: 0
Start: 2020-07-07 | End: 2020-08-05

## 2020-07-07 RX ORDER — ATENOLOL 25 MG/1
1 TABLET ORAL
Qty: 30 | Refills: 0
Start: 2020-07-07 | End: 2020-08-05

## 2020-07-07 RX ADMIN — QUETIAPINE FUMARATE 50 MILLIGRAM(S): 200 TABLET, FILM COATED ORAL at 09:27

## 2020-07-07 RX ADMIN — PANTOPRAZOLE SODIUM 40 MILLIGRAM(S): 20 TABLET, DELAYED RELEASE ORAL at 06:24

## 2020-07-07 RX ADMIN — LOSARTAN POTASSIUM 25 MILLIGRAM(S): 100 TABLET, FILM COATED ORAL at 09:27

## 2020-07-07 RX ADMIN — Medication 150 MILLIGRAM(S): at 09:27

## 2020-07-07 RX ADMIN — Medication 1 MILLIGRAM(S): at 09:27

## 2020-07-07 RX ADMIN — Medication 81 MILLIGRAM(S): at 09:27

## 2020-07-07 RX ADMIN — BUPROPION HYDROCHLORIDE 300 MILLIGRAM(S): 150 TABLET, EXTENDED RELEASE ORAL at 09:27

## 2020-07-07 RX ADMIN — ATENOLOL 50 MILLIGRAM(S): 25 TABLET ORAL at 09:27

## 2020-07-22 ENCOUNTER — TRANSCRIPTION ENCOUNTER (OUTPATIENT)
Age: 65
End: 2020-07-22

## 2020-07-30 ENCOUNTER — TRANSCRIPTION ENCOUNTER (OUTPATIENT)
Age: 65
End: 2020-07-30

## 2020-10-09 NOTE — ED ADULT NURSE NOTE - GASTROINTESTINAL WDL
Problem: Impaired Functional Mobility  Goal: Achieve highest/safest level of mobility/gait  Description: Interventions:  - Assess patient's functional ability and stability  - Promote increasing activity/tolerance for mobility and gait  - Educate and eng Abdomen soft, nontender, nondistended, bowel sounds present in all 4 quadrants.

## 2020-11-09 ENCOUNTER — TRANSCRIPTION ENCOUNTER (OUTPATIENT)
Age: 65
End: 2020-11-09

## 2024-03-01 NOTE — ED PROVIDER NOTE - CLINICAL SUMMARY MEDICAL DECISION MAKING FREE TEXT BOX
The patient is a 52y Female complaining of suture/staple removal. 63F PMH bipolar, HTN p/w NV. Pt states that she has 2w of multiple NBNB NV. Also w/ 3-4 episodes of black tarry stool x2w. Also ~1mo of ~30lb unintentional weight loss and occasional night sweats. Denies any abd pain but feels "sensitive" to L abd. Vitals wnl, exam as above. 63F PMH bipolar, HTN p/w NV. Pt states that she has 2w of multiple NBNB NV. Also w/ 3-4 episodes of black tarry stool x2w. Also ~1mo of ~30lb unintentional weight loss and occasional night sweats. Denies any abd pain but feels "sensitive" to L abd. Vitals wnl, exam as above.  ddx: possible divertic vs. UGIB, ?underlying neoplasm  CBC, cmp, coags, T and S, CT, IVF, symptom control, reassess.